# Patient Record
Sex: FEMALE | Race: WHITE | Employment: FULL TIME | URBAN - METROPOLITAN AREA
[De-identification: names, ages, dates, MRNs, and addresses within clinical notes are randomized per-mention and may not be internally consistent; named-entity substitution may affect disease eponyms.]

---

## 2017-02-23 ENCOUNTER — HOSPITAL ENCOUNTER (EMERGENCY)
Facility: HOSPITAL | Age: 62
Discharge: HOME | End: 2017-02-23
Attending: EMERGENCY MEDICINE
Payer: OTHER MISCELLANEOUS

## 2017-02-23 ENCOUNTER — APPOINTMENT (OUTPATIENT)
Dept: GENERAL RADIOLOGY | Facility: HOSPITAL | Age: 62
End: 2017-02-23
Attending: EMERGENCY MEDICINE
Payer: OTHER MISCELLANEOUS

## 2017-02-23 VITALS
BODY MASS INDEX: 29.23 KG/M2 | RESPIRATION RATE: 18 BRPM | HEART RATE: 57 BPM | OXYGEN SATURATION: 100 % | WEIGHT: 165 LBS | HEIGHT: 63 IN | DIASTOLIC BLOOD PRESSURE: 59 MMHG | TEMPERATURE: 98 F | SYSTOLIC BLOOD PRESSURE: 128 MMHG

## 2017-02-23 DIAGNOSIS — S80.01XA CONTUSION OF RIGHT KNEE, INITIAL ENCOUNTER: ICD-10-CM

## 2017-02-23 DIAGNOSIS — S63.501A WRIST SPRAIN, RIGHT, INITIAL ENCOUNTER: ICD-10-CM

## 2017-02-23 DIAGNOSIS — S01.81XA FACIAL LACERATION, INITIAL ENCOUNTER: Primary | ICD-10-CM

## 2017-02-23 PROCEDURE — 99284 EMERGENCY DEPT VISIT MOD MDM: CPT

## 2017-02-23 PROCEDURE — 73562 X-RAY EXAM OF KNEE 3: CPT

## 2017-02-23 PROCEDURE — 73110 X-RAY EXAM OF WRIST: CPT
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
## 2017-02-23 NOTE — ED PROVIDER NOTES
Patient Seen in: [facility] Emergency Department    History   Patient presents with:  Fall (musculoskeletal, neurologic)    Stated Complaint: fall with scissors - pt c/o right wrist and knee pain, laceration over left eye*    HPI    56-year-old fema anastrozole 1 MG Oral Tab tab,  Take 1 tablet (1 mg total) by mouth daily.        Family History   Problem Relation Age of Onset   • Diabetes Father    • Heart Disorder Mother    • Psychiatric Mother      memory loss   • Cancer Brother 61     rectal   • Can There is no audible wheezes, Rales, rhonchi. Abdomen: Soft, nontender, nondistended. There is bowel sounds throughout 4 quadrants. There is no guarding or rebound tenderness. Extremities: There is no clubbing, cyanosis, edema.   Right wrist: Patient is

## 2017-04-14 PROBLEM — Z91.09 MULTIPLE ENVIRONMENTAL ALLERGIES: Status: ACTIVE | Noted: 2017-04-14

## 2017-06-20 PROCEDURE — 36415 COLL VENOUS BLD VENIPUNCTURE: CPT | Performed by: INTERNAL MEDICINE

## 2017-06-20 PROCEDURE — 86480 TB TEST CELL IMMUN MEASURE: CPT | Performed by: INTERNAL MEDICINE

## 2018-05-01 PROCEDURE — 87045 FECES CULTURE AEROBIC BACT: CPT | Performed by: INTERNAL MEDICINE

## 2018-05-01 PROCEDURE — 87427 SHIGA-LIKE TOXIN AG IA: CPT | Performed by: INTERNAL MEDICINE

## 2018-05-01 PROCEDURE — 87272 CRYPTOSPORIDIUM AG IF: CPT | Performed by: INTERNAL MEDICINE

## 2018-05-01 PROCEDURE — 87177 OVA AND PARASITES SMEARS: CPT | Performed by: INTERNAL MEDICINE

## 2018-05-01 PROCEDURE — 87046 STOOL CULTR AEROBIC BACT EA: CPT | Performed by: INTERNAL MEDICINE

## 2018-05-01 PROCEDURE — 87209 SMEAR COMPLEX STAIN: CPT | Performed by: INTERNAL MEDICINE

## 2018-05-01 PROCEDURE — 87329 GIARDIA AG IA: CPT | Performed by: INTERNAL MEDICINE

## 2018-05-01 PROCEDURE — 89055 LEUKOCYTE ASSESSMENT FECAL: CPT | Performed by: INTERNAL MEDICINE

## 2018-05-01 PROCEDURE — 87493 C DIFF AMPLIFIED PROBE: CPT | Performed by: INTERNAL MEDICINE

## 2018-06-13 PROCEDURE — 86803 HEPATITIS C AB TEST: CPT | Performed by: INTERNAL MEDICINE

## 2018-08-13 PROBLEM — Z85.3 HISTORY OF BREAST CANCER: Status: ACTIVE | Noted: 2018-08-13

## 2018-08-15 PROCEDURE — 86788 WEST NILE VIRUS AB IGM: CPT | Performed by: INTERNAL MEDICINE

## 2018-08-15 PROCEDURE — 86789 WEST NILE VIRUS ANTIBODY: CPT | Performed by: INTERNAL MEDICINE

## 2018-08-15 PROCEDURE — 86658 ENTEROVIRUS ANTIBODY: CPT | Performed by: INTERNAL MEDICINE

## 2018-08-15 PROCEDURE — 86790 VIRUS ANTIBODY NOS: CPT | Performed by: INTERNAL MEDICINE

## 2018-09-24 PROCEDURE — 87449 NOS EACH ORGANISM AG IA: CPT | Performed by: INTERNAL MEDICINE

## 2018-12-13 ENCOUNTER — APPOINTMENT (OUTPATIENT)
Dept: CT IMAGING | Facility: HOSPITAL | Age: 63
End: 2018-12-13
Attending: PHYSICIAN ASSISTANT
Payer: OTHER MISCELLANEOUS

## 2018-12-13 ENCOUNTER — HOSPITAL ENCOUNTER (EMERGENCY)
Facility: HOSPITAL | Age: 63
Discharge: HOME | End: 2018-12-13
Attending: EMERGENCY MEDICINE
Payer: OTHER MISCELLANEOUS

## 2018-12-13 VITALS
HEART RATE: 55 BPM | RESPIRATION RATE: 16 BRPM | SYSTOLIC BLOOD PRESSURE: 125 MMHG | DIASTOLIC BLOOD PRESSURE: 78 MMHG | TEMPERATURE: 97 F | HEIGHT: 64 IN | WEIGHT: 170 LBS | OXYGEN SATURATION: 96 % | BODY MASS INDEX: 29.02 KG/M2

## 2018-12-13 DIAGNOSIS — S06.0X0A CONCUSSION WITHOUT LOSS OF CONSCIOUSNESS, INITIAL ENCOUNTER: ICD-10-CM

## 2018-12-13 DIAGNOSIS — S00.83XA TRAUMATIC HEMATOMA OF FOREHEAD, INITIAL ENCOUNTER: ICD-10-CM

## 2018-12-13 DIAGNOSIS — S00.83XA CONTUSION OF FACE, INITIAL ENCOUNTER: Primary | ICD-10-CM

## 2018-12-13 DIAGNOSIS — S09.90XA CLOSED HEAD INJURY, INITIAL ENCOUNTER: ICD-10-CM

## 2018-12-13 PROCEDURE — 70450 CT HEAD/BRAIN W/O DYE: CPT | Performed by: PHYSICIAN ASSISTANT

## 2018-12-13 PROCEDURE — 99284 EMERGENCY DEPT VISIT MOD MDM: CPT
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
## 2019-08-29 PROCEDURE — 88342 IMHCHEM/IMCYTCHM 1ST ANTB: CPT | Performed by: PHYSICIAN ASSISTANT

## 2021-08-04 PROBLEM — D03.30 MELANOMA IN SITU OF FACE (HCC): Status: ACTIVE | Noted: 2021-08-04

## 2025-04-03 NOTE — LETTER
[facility] Interventional Pain Medicine             Chief Complaint left chest wall pain  HISTORY OF PRESENT ILLNESS:   The patient is a 73 year old female PMH SLE and sjogren's syndrome who presents with left chest wall pain. Pt states pain is 7/10, comes intermittently, and is exacerbated by walking or bending over. Takes tylenol with minimal relief. Pt has a history of shingles several years ago that presented on the left chest wall.     Today's Pain:  Pain location: left chest wall  Pain character: aching and burning  Pain Score: 7/10  Pain duration: intermittent  The Pain is Aggravating with: bending and walking  The Pain is relieved with:  none  Tried: Home PT for the last 6 weeks, OTC, Rest         Non-opiate modalities attempted:  -Physical therapy: Yes  -NSAIDs: Yes  -Acetaminophen: Yes  -CBT: Yes    Current Pain Meds:  Tylenol PRN    Injection Hx:  None    Denies current infection, anticoagulation or allergy to Latex/steroid/contrast dye. Denies weakness, numbness or bowel/bladder incontinence. Denies Saddle Anesthesia.    Opioid Therapy  External record: [place] prescription monitoring checked: Yes  (PDMP)  UDS: None    Current Medications:  .  Current Outpatient Medications   Medication Sig Dispense Refill    simvastatin (ZOCOR) 20 MG tablet TAKE 1 TABLET BY MOUTH EVERY NIGHT 90 tablet 0    levocetirizine (XYZAL) 5 MG tablet TAKE 1 TABLET BY MOUTH DAILY AS NEEDED FOR ALLERGIES 30 tablet 5    fluticasone (FLONASE) 50 MCG/ACT nasal spray SHAKE LIQUID AND USE 1 SPRAY IN EACH NOSTRIL TWICE DAILY AS NEEDED FOR CONGESTION OR RUNNY NOSE OR COUGH 16 g 5    propRANolol (INDERAL) 10 MG tablet TAKE 1 TABLET BY MOUTH TWICE DAILY 180 tablet 0    omeprazole (PrilOSEC) 20 MG capsule Take 1 capsule by mouth 2 times daily as needed (gastritis, acid reflux). 90 capsule 1    meloxicam (MOBIC) 15 MG tablet Take 1 tablet by mouth daily. 90 tablet 1    hydroxychloroquine (PLAQUENIL) 200 MG tablet TAKE 2 TABLETS  Date & Time: 12/13/2018, 8:39 PM  Patient: Angela Salazar  Encounter Provider(s): Avis Wilson MD  [place], [place]       To Whom It May Concern:    Syed Jhaveri was seen and treated in our department on 12/13/2018.  She may retur BY MOUTH DAILY MONDAY THROUGH FRIDAY, THEN TAKE 1 TABLET DAILY ON SATURDAY AND SUNDAY 180 tablet 2    clotrimazole (LOTRIMIN) 1 % cream Apply topically 2 times daily. Apply to affected area twice daily 30 g 3    FLUoxetine (PROzac) 20 MG capsule Take 1 capsule by mouth daily. 90 capsule 3    estrogens conjugated (Premarin) vaginal cream Place 1 g vaginally nightly for 14 days, THEN 1 g 2 days a week. 30 g 4    fluconazole (Diflucan) 150 MG tablet Take 1 tablet by mouth for 1 dose, then repeat in 72 hours. 2 tablet 0    alendronate (FOSAMAX) 35 MG tablet TAKE 1 TABLET BY MOUTH EVERY 7 DAYS 12 tablet 0    clotrimazole (LOTRIMIN) 1 % cream Apply 1 Application topically 2 times daily.      Calcium Carb-Cholecalciferol 600-12.5 MG-MCG Cap       FOLIC ACID PO Take 1,333 mcg by mouth. OTC      ketoconazole (NIZORAL) 2 % cream Apply topically daily. 15 g 0     No current facility-administered medications for this encounter.        Non-opiate modalities attempted and maximized:  -Physical therapy: Yes  -NSAIDs: Yes, Acetaminophen: Yes, Heat: Yes  -CBT: Yes    Opioid Therapy  [place] prescription monitoring checked: Yes  UDS:   Current medications for chronic pain  [unfilled]  [unfilled]  [unfilled]             [facility]:  Past Medical History:   Diagnosis Date    Anxiety     Arthritis of big toe 12/03/2018    Breast pain, right 05/06/2021    Candida infection of flexural skin 07/21/2023    Chronic right-sided low back pain with right-sided sciatica 12/03/2018    Dysuria 06/03/2019    Essential (primary) hypertension     Fall 03/18/2021    Forgetfulness     GERD (gastroesophageal reflux disease)     Graves' disease 03/29/2016    Head ache     Hematuria, microscopic 10/29/2020    Herpes zoster without complication 08/11/2022    History of Helicobacter pylori infection 02/13/2019    Hyperlipidemia     IFG (impaired fasting glucose) 10/29/2020    Memory difficulties 10/25/2019    TASHA (obstructive sleep apnea)      Patellofemoral pain syndrome of right knee 2022    SLE (systemic lupus erythematosus)  (CMD)     Tinea corporis 2024    Tinnitus of both ears 2024    Urinary tract infection without hematuria 2019       PSH:  Past Surgical History:   Procedure Laterality Date    Breast surgery Bilateral     2 cysts removed on right side, left breast mass     section, low transverse      x 1    Cystourethroscopy  2021    NL       Medications:  [unfilled]    Allergies:  ALLERGIES:  No Known Allergies    Social History:  Social History     Socioeconomic History    Marital status: Legally      Spouse name: Not on file    Number of children: Not on file    Years of education: Not on file    Highest education level: Not on file   Occupational History    Not on file   Tobacco Use    Smoking status: Never    Smokeless tobacco: Never   Vaping Use    Vaping status: never used   Substance and Sexual Activity    Alcohol use: No    Drug use: Never    Sexual activity: Not Currently     Partners: Male     Birth control/protection: None   Other Topics Concern    Not on file   Social History Narrative    Not on file     Social Determinants of Health     Financial Resource Strain: Not on file   Food Insecurity: Not on file   Transportation Needs: Not on file   Physical Activity: Not on file   Stress: Not on file   Social Connections: Not on file   Interpersonal Safety: Not on file (2023)       Family History: No family history of chronic pain disorders in her family.    Review of Systems:  GENERAL:No fevers  HEENT: Negative for frequent or significant headaches  NECK: Negative for swelling  RESPIRATORY: Negative for shortness of breath  CARDIOVASCULAR: Negative for chest pain  GASTROINTESTINAL: Neg for abdominal pain  GENITOURINARY: Negative for incontinence  MUSCULOSKELETAL: As above in HPI  NEUROLOGIC:Negative for syncope  SKIN: Negative for rash  ENDOCRINE: Negative for cold or heat  intolerance           ----------------PHYSICAL EXAMINATION  --------------------     There were no vitals taken for this visit.       ____________________ CONSTITUTIONAL ____________________     Gen. appearance: Appears well nourished, well developed and appropriately groomed               ____________________ CARDIOVASCULAR ____________________   HEART: Regular rate and rhythm   RESPIRATORY:  Non-labored respirations  PERIPHERAL VASCULAR: Extremities are warm and dry; there is no edema, swelling, varicosities, or palpable tenderness in the upper or lower extremities             ____________________ MUSCULOSKELETAL ____________________   GAIT/STATION: Gait is antalgic.   The patient is able to ambulate without assistance  RANGE OF MOTION: Full range of motion of all 4 extremities without pain, crepitus, contracture in the shoulders, elbows, wrists, hips, knees and ankles  SPINE: Normal lordosis in the cervical and lumbar spine, Palpation/percussion at the midline cervical/thoracic/lumbar level reveals NO tenderness  TRIGGER POINTS: painful muscle spasm NOT identified  STRENGTH: strength intact in all extremities.   The patient was cooperative and exhibited normal effort during motor and coordination exams      ________________________ Skin ____________________   Skin color, temperature, & turgor are normal in all 4 extremities. No rashes/suspicious skin lesions. No edema in the upper or lower extremities.  Normal nail bed appearance in all 4 extremities.  Radial and dorsalis pedis pulses are palpable bilaterally.        ____________________ Neurologic ____________________   ORIENTATION: Alert & oriented to person, place,time,event   PSYCHIATRIC: MOOD/AFFECT: Normal      COORDINATION: Normal coordination and balance, negative Romberg sign      SENSATION: Intact to light touch, pin prick and temperature sensation in the cervical/thoracic/lumbar/sacral bilateral dermatomes      -------- MEDICAL DECISION MAKING    ----------           Important Imaging:  I have independently reviewed and interpreted the following images and reviewed them with the patient.     Xray Ribs 2/25/25  Impression: Normal heart size. No mediastinal widening.No lung consolidation. No effusions or cephalization of vessels. No pneumothorax. Ribs demonstrate no fractures or periosteal changes. No lytic or sclerotic changes.    CT Abdomen 5/7/23  Impression: Mild intrahepatic and extrahepatic biliary dilatation, similar to prior exam, with the common bile duct measuring up to 8 mm.  This could be age related changes. Correlation with LFTs is recommended.   Mild thickening of the distal stomach could be due to incomplete distention or gastritis. Clinical correlation recommended. Endoscopy should be  considered when medically appropriate.      IMPRESSION/RECOMMENDATIONS:  Ana Edmond is a 73 year old female  with PMH of SLE and Sjogren's Syndrome presenting for evaluation and treatment of left chest wall pain. The patient has failed conservative treatment options for greater than 3 months such as facility/physician directed Physical therapy for the last 6 weeks, NSAIDs, Acetaminophen, Muscle relaxants and CBT therapy.      Pt is presenting with chronic left chest wall pain. Describes pain as 7/10, intermittent, sharp and burning. Pain is located in region where pt had shingles episode several years ago. Otherwise, no evidence of rib fractures or chest wall pathology. CT abdomen in 2023 with non-specific findings. Likely presenting with post-herpetic neuralgia.     Will try Lyrica and follow up in 2 months. Also advised patient to try OTC capzasin cream.    Medication Regimen:  Tylenol PRN    Injection History:  -None    Diagnosis:  1. Post-herpetic neuralgia   Patient was given access to  \"Information on nonopioid Alternatives for the Treatment of Pain\" educational pamplet. We also discussed both risk and benefits of our treatment plan and the  [place] understands. [place] Pertinent imaging and notes reviews.  Discussed include but were not limited to non opioid medicinal products or drug products, interventional procedures and/or treatments, acupuncture, massage therapy, PT and OT.                    ____________________ Treatment Recommendations _____________     Injections Ordered:  None         Activity Level:As tolerated         Medications prescribed:  Lyrica 75 mg BID  Imaging ordered: None   Referrals: None    Follow up: 2 months    Pari Yañez,   Jonathan Valle MD  [facility]    Education was provided to the patientregarding condition / disease, activity, safety, medical treatment and equipment, diagnostic testing, operative and invasive procedures, medications and support / care. The education was completed usingface to face conversation, and the recipient expressed understanding through verbal confirmation

## (undated) NOTE — ED AVS SNAPSHOT
[facility] Emergency Department    [place]  [address] [address]    Phone:  [phone]    Fax:  [address]   MRN: [id number]    Department:  [facility] Emergency Department   Date of Visit:  2/2 coverage for follow-up care and referrals. [address] [employer] [place] [phone]  Pediatric [phone] Emergency Department   [phone]       To Check ER Wait Times:  TEXT 'ERwait' to [id number]      Click [email or web address] will be contacted. Please make sure we have your correct phone number before you leave. After you leave, you should follow the attached instructions. I have read and understand the instructions given to me by my caregivers.         24-Hour Pharmacies XR WRIST COMPLETE (MIN 3 VIEWS), RIGHT (CPT=73110) (Final result) Result time:  02/23/17 11:26:30    Final result    Impression:    CONCLUSION:  No acute fractures. Mild osteoarthritic changes.            Dictated by: Nya Kim MD on 2/23/2017 at 11: If you've recently had a stay at the Hospital you can access your discharge instructions in [employer] by going to Visits < Admission Summaries.  If you've been to the Emergency Department or your doctor's office, you can view your past visit information in My

## (undated) NOTE — ED AVS SNAPSHOT
Cristy Rucker   MRN: [id number]    Department:  [facility] Emergency Department   Date of Visit:  12/13/2018           Disclosure     Insurance plans vary and the physician(s) referred by the ER may not be covered by your plan.  Please contact y tell this physician (or your personal doctor if your instructions are to return to your personal doctor) about any new or lasting problems. The primary care or specialist physician will see patients referred from the [facility] Emergency Department.  Otoniel Waller

## (undated) NOTE — ED AVS SNAPSHOT
[facility] Emergency Department    [place]  [address] [address]    Phone:  [phone]    Fax:  [address]   MRN: [id number]    Department:  [facility] Emergency Department   Date of Visit:  2/2 IF THERE IS ANY CHANGE OR WORSENING OF YOUR CONDITION, CALL YOUR PRIMARY CARE PHYSICIAN AT ONCE OR RETURN IMMEDIATELY TO THE EMERGENCY DEPARTMENT.     If you have been prescribed any medication(s), please fill your prescription right away and begin taking t